# Patient Record
Sex: MALE | Race: OTHER | NOT HISPANIC OR LATINO
[De-identification: names, ages, dates, MRNs, and addresses within clinical notes are randomized per-mention and may not be internally consistent; named-entity substitution may affect disease eponyms.]

---

## 2020-10-01 PROBLEM — Z00.00 ENCOUNTER FOR PREVENTIVE HEALTH EXAMINATION: Status: ACTIVE | Noted: 2020-10-01

## 2020-10-09 ENCOUNTER — APPOINTMENT (OUTPATIENT)
Dept: UROLOGY | Facility: CLINIC | Age: 75
End: 2020-10-09
Payer: MEDICARE

## 2020-10-09 VITALS
TEMPERATURE: 97.7 F | BODY MASS INDEX: 27 KG/M2 | RESPIRATION RATE: 16 BRPM | DIASTOLIC BLOOD PRESSURE: 70 MMHG | WEIGHT: 172 LBS | SYSTOLIC BLOOD PRESSURE: 137 MMHG | HEART RATE: 55 BPM | HEIGHT: 67 IN

## 2020-10-09 DIAGNOSIS — N20.0 CALCULUS OF KIDNEY: ICD-10-CM

## 2020-10-09 DIAGNOSIS — R35.1 BENIGN PROSTATIC HYPERPLASIA WITH LOWER URINARY TRACT SYMPMS: ICD-10-CM

## 2020-10-09 DIAGNOSIS — Z86.39 PERSONAL HISTORY OF OTHER ENDOCRINE, NUTRITIONAL AND METABOLIC DISEASE: ICD-10-CM

## 2020-10-09 DIAGNOSIS — R97.20 ELEVATED PROSTATE, SPECIFIC ANTIGEN [PSA]: ICD-10-CM

## 2020-10-09 DIAGNOSIS — Z78.9 OTHER SPECIFIED HEALTH STATUS: ICD-10-CM

## 2020-10-09 DIAGNOSIS — Z80.1 FAMILY HISTORY OF MALIGNANT NEOPLASM OF TRACHEA, BRONCHUS AND LUNG: ICD-10-CM

## 2020-10-09 DIAGNOSIS — N40.1 BENIGN PROSTATIC HYPERPLASIA WITH LOWER URINARY TRACT SYMPMS: ICD-10-CM

## 2020-10-09 PROCEDURE — 99204 OFFICE O/P NEW MOD 45 MIN: CPT

## 2020-10-09 RX ORDER — TAMSULOSIN HYDROCHLORIDE 0.4 MG/1
0.4 CAPSULE ORAL
Refills: 0 | Status: ACTIVE | COMMUNITY

## 2020-10-09 RX ORDER — PRAVASTATIN SODIUM 80 MG/1
80 TABLET ORAL
Refills: 0 | Status: ACTIVE | COMMUNITY

## 2020-10-09 NOTE — PHYSICAL EXAM
[General Appearance - Well Developed] : well developed [General Appearance - Well Nourished] : well nourished [Normal Appearance] : normal appearance [Well Groomed] : well groomed [General Appearance - In No Acute Distress] : no acute distress [Edema] : no peripheral edema [Respiration, Rhythm And Depth] : normal respiratory rhythm and effort [Exaggerated Use Of Accessory Muscles For Inspiration] : no accessory muscle use [Abdomen Soft] : soft [Abdomen Tenderness] : non-tender [Costovertebral Angle Tenderness] : no ~M costovertebral angle tenderness [Urethral Meatus] : meatus normal [Penis Abnormality] : normal circumcised penis [Urinary Bladder Findings] : the bladder was normal on palpation [Scrotum] : the scrotum was normal [Testes Tenderness] : no tenderness of the testes [Testes Mass (___cm)] : there were no testicular masses [Prostate Tenderness] : the prostate was not tender [No Prostate Nodules] : no prostate nodules [Prostate Enlarged] : was enlarged [Normal Station and Gait] : the gait and station were normal for the patient's age [] : no rash [No Focal Deficits] : no focal deficits [Oriented To Time, Place, And Person] : oriented to person, place, and time [Affect] : the affect was normal [Mood] : the mood was normal [Not Anxious] : not anxious [Inguinal Lymph Nodes Enlarged Bilaterally] : inguinal

## 2020-10-09 NOTE — ASSESSMENT
[FreeTextEntry1] : He seems to be satisfied from urinary standpoint. Continue Flomax. He stopped Proscar since he did not notice a significant difference in urination. Residual urine volume is acceptable. Recommended returning to [facility] for renal sonogram in followup to kidney stone. PSA level was discussed in detail. His current level is same as several years ago when he was not on finasteride. Finasteride reduces PSA level by 50% or more. He could continue observation or consider MRI of the prostate. For now, he will proceed with watchful waiting and followup in 6-9 months. Risk of prostate cancer with any PSA level was discussed.\par \par Josue Mancera MD, FACS\par [facility]\par  of Urology\par \par [address], [address]\[place] [address]\par \par [address], [address]\[place] [address]\par \par Tel: [phone]\par Fax: [phone]

## 2020-10-09 NOTE — HISTORY OF PRESENT ILLNESS
[FreeTextEntry1] : He is a 75-year-old man who was seen today for initial visit for several urological issues. He has history of enlarged prostate, urinary symptoms, elevated PSA level and kidney stones. He is on Flomax now. Nocturia is one or 2 times. He used to be on Proscar which he stopped in early 2020. He has a sensation of incomplete bladder emptying. PSA level was 5.9 in August 2020, 1.68 in 2018 and 5.8 in 2014. He recalls having at least 3-4 prostate biopsies when he used to live in [place]. He was told they were negative. Also once he required shock wave lithotripsy and stent placement for kidney stone. He has not had a recent imaging of the kidney. Residual urine was 80 cc.

## 2020-10-09 NOTE — LETTER BODY
[Dear  ___] : Dear  [unfilled], [Consult Letter:] : I had the pleasure of evaluating your patient, [unfilled]. [Consult Closing:] : Thank you very much for allowing me to participate in the care of this patient.  If you have any questions, please do not hesitate to contact me. [FreeTextEntry1] : [facility]\par [address] \[place], [place], [address] \par [phone] \par

## 2020-10-09 NOTE — REVIEW OF SYSTEMS
[see HPI] : see HPI [Negative] : Heme/Lymph [History of kidney stones] : history of kidney stones [Wake up at night to urinate  How many times?  ___] : wakes up to urinate [unfilled] times during the night [Slow urine stream] : slow urine stream [Bladder fullness after urinating] : bladder fullness after urinating

## 2021-06-11 ENCOUNTER — APPOINTMENT (OUTPATIENT)
Dept: UROLOGY | Facility: CLINIC | Age: 76
End: 2021-06-11

## 2023-02-08 ENCOUNTER — OFFICE (OUTPATIENT)
Dept: URBAN - METROPOLITAN AREA CLINIC 1 | Facility: CLINIC | Age: 78
Setting detail: OPHTHALMOLOGY
End: 2023-02-08
Payer: MEDICARE

## 2023-02-08 DIAGNOSIS — H25.13: ICD-10-CM

## 2023-02-08 DIAGNOSIS — H01.001: ICD-10-CM

## 2023-02-08 DIAGNOSIS — H01.002: ICD-10-CM

## 2023-02-08 DIAGNOSIS — H01.004: ICD-10-CM

## 2023-02-08 PROBLEM — H01.005 BLEPHARITIS; RIGHT UPPER LID, RIGHT LOWER LID, LEFT UPPER LID, LEFT LOWER LID: Status: ACTIVE | Noted: 2023-02-08

## 2023-02-08 PROBLEM — H16.223 DRY EYE SYNDROME K SICCA; BOTH EYES: Status: ACTIVE | Noted: 2023-02-08

## 2023-02-08 PROCEDURE — 99204 OFFICE O/P NEW MOD 45 MIN: CPT | Performed by: OPHTHALMOLOGY

## 2023-02-08 ASSESSMENT — VISUAL ACUITY
OS_BCVA: 20/30
OD_BCVA: 20/30

## 2023-02-08 ASSESSMENT — REFRACTION_CURRENTRX
OD_OVR_VA: 20/
OD_SPHERE: -0.75
OD_ADD: +2.50
OD_VPRISM_DIRECTION: PROGS
OS_VPRISM_DIRECTION: PROGS
OS_CYLINDER: -1.00
OS_SPHERE: +0.25
OS_AXIS: 067
OS_ADD: +2.50
OS_OVR_VA: 20/

## 2023-02-08 ASSESSMENT — LID EXAM ASSESSMENTS
OS_BLEPHARITIS: LLL LUL
OD_BLEPHARITIS: RLL RUL

## 2023-02-08 ASSESSMENT — KERATOMETRY
OD_AXISANGLE_DEGREES: 038
OD_K1POWER_DIOPTERS: 41.75
OD_K2POWER_DIOPTERS: 42.00
OS_K2POWER_DIOPTERS: 42.00
OS_K1POWER_DIOPTERS: 42.00
OS_AXISANGLE_DEGREES: 090

## 2023-02-08 ASSESSMENT — SPHEQUIV_DERIVED
OS_SPHEQUIV: 0.375
OD_SPHEQUIV: -0.875
OD_SPHEQUIV: -0.875
OS_SPHEQUIV: 0.375

## 2023-02-08 ASSESSMENT — AXIALLENGTH_DERIVED
OD_AL: 24.5685
OS_AL: 24.0038
OD_AL: 24.5685
OS_AL: 24.0038

## 2023-02-08 ASSESSMENT — REFRACTION_MANIFEST
OD_CYLINDER: -1.25
OD_SPHERE: -0.25
OS_VA1: 20/25-2
OS_CYLINDER: -0.75
OS_SPHERE: +0.75
OD_VA1: 20/30-2
OD_AXIS: 115
OS_AXIS: 060

## 2023-02-08 ASSESSMENT — TONOMETRY
OD_IOP_MMHG: 12
OS_IOP_MMHG: 14

## 2023-02-08 ASSESSMENT — REFRACTION_AUTOREFRACTION
OS_SPHERE: +0.75
OS_AXIS: 058
OD_AXIS: 114
OD_CYLINDER: -1.25
OS_CYLINDER: -0.75
OD_SPHERE: -0.25

## 2023-02-08 ASSESSMENT — CONFRONTATIONAL VISUAL FIELD TEST (CVF)
OD_FINDINGS: FULL
OS_FINDINGS: FULL

## 2023-04-24 ENCOUNTER — OFFICE (OUTPATIENT)
Dept: URBAN - METROPOLITAN AREA CLINIC 1 | Facility: CLINIC | Age: 78
Setting detail: OPHTHALMOLOGY
End: 2023-04-24
Payer: MEDICARE

## 2023-04-24 DIAGNOSIS — H25.13: ICD-10-CM

## 2023-04-24 DIAGNOSIS — H25.12: ICD-10-CM

## 2023-04-24 PROCEDURE — 99213 OFFICE O/P EST LOW 20 MIN: CPT | Performed by: OPHTHALMOLOGY

## 2023-04-24 PROCEDURE — 92136 OPHTHALMIC BIOMETRY: CPT | Performed by: OPHTHALMOLOGY

## 2023-04-24 ASSESSMENT — SPHEQUIV_DERIVED
OS_SPHEQUIV: 0.375
OD_SPHEQUIV: -0.875
OS_SPHEQUIV: 0.375
OD_SPHEQUIV: -0.875

## 2023-04-24 ASSESSMENT — KERATOMETRY
OS_AXISANGLE_DEGREES: 090
OS_K1POWER_DIOPTERS: 42.25
OS_K2POWER_DIOPTERS: 42.25
OD_AXISANGLE_DEGREES: 037
OD_K1POWER_DIOPTERS: 41.75
OD_K2POWER_DIOPTERS: 42.00

## 2023-04-24 ASSESSMENT — REFRACTION_MANIFEST
OD_AXIS: 115
OS_AXIS: 060
OD_SPHERE: -0.25
OD_CYLINDER: -1.25
OS_SPHERE: +0.75
OS_VA1: 20/25-2
OS_CYLINDER: -0.75
OD_VA1: 20/30-2

## 2023-04-24 ASSESSMENT — REFRACTION_AUTOREFRACTION
OS_SPHERE: +0.75
OD_AXIS: 116
OD_CYLINDER: -1.25
OD_SPHERE: -0.25
OS_AXIS: 067
OS_CYLINDER: -0.75

## 2023-04-24 ASSESSMENT — AXIALLENGTH_DERIVED
OS_AL: 23.9091
OD_AL: 24.5685
OS_AL: 23.9091
OD_AL: 24.5685

## 2023-04-24 ASSESSMENT — LID EXAM ASSESSMENTS
OD_BLEPHARITIS: RLL RUL
OS_BLEPHARITIS: LLL LUL

## 2023-04-24 ASSESSMENT — REFRACTION_CURRENTRX
OS_OVR_VA: 20/
OD_ADD: +2.50
OS_VPRISM_DIRECTION: PROGS
OD_OVR_VA: 20/
OS_CYLINDER: -1.00
OS_AXIS: 067
OS_SPHERE: +0.25
OS_ADD: +2.50
OD_VPRISM_DIRECTION: PROGS
OD_SPHERE: -0.75

## 2023-04-24 ASSESSMENT — TONOMETRY
OS_IOP_MMHG: 15
OD_IOP_MMHG: 15

## 2023-04-24 ASSESSMENT — CONFRONTATIONAL VISUAL FIELD TEST (CVF)
OS_FINDINGS: FULL
OD_FINDINGS: FULL

## 2023-04-24 ASSESSMENT — VISUAL ACUITY
OD_BCVA: 20/25-
OS_BCVA: 20/30-

## 2023-05-04 ENCOUNTER — ASC (OUTPATIENT)
Dept: URBAN - METROPOLITAN AREA SURGERY 8 | Facility: SURGERY | Age: 78
Setting detail: OPHTHALMOLOGY
End: 2023-05-04
Payer: MEDICARE

## 2023-05-04 DIAGNOSIS — H52.212: ICD-10-CM

## 2023-05-04 DIAGNOSIS — H25.12: ICD-10-CM

## 2023-05-04 PROCEDURE — FEMTO FEMTOSECOND LASER: Performed by: OPHTHALMOLOGY

## 2023-05-04 PROCEDURE — 66984 XCAPSL CTRC RMVL W/O ECP: CPT | Performed by: OPHTHALMOLOGY

## 2023-05-05 ENCOUNTER — RX ONLY (RX ONLY)
Age: 78
End: 2023-05-05

## 2023-05-05 ENCOUNTER — OFFICE (OUTPATIENT)
Dept: URBAN - METROPOLITAN AREA CLINIC 1 | Facility: CLINIC | Age: 78
Setting detail: OPHTHALMOLOGY
End: 2023-05-05
Payer: MEDICARE

## 2023-05-05 DIAGNOSIS — Z96.1: ICD-10-CM

## 2023-05-05 PROCEDURE — 99024 POSTOP FOLLOW-UP VISIT: CPT

## 2023-05-05 ASSESSMENT — REFRACTION_CURRENTRX
OD_SPHERE: -0.75
OS_SPHERE: +0.25
OS_AXIS: 067
OD_OVR_VA: 20/
OD_VPRISM_DIRECTION: PROGS
OS_CYLINDER: -1.00
OS_ADD: +2.50
OS_VPRISM_DIRECTION: PROGS
OS_OVR_VA: 20/
OD_ADD: +2.50

## 2023-05-05 ASSESSMENT — KERATOMETRY
OS_AXISANGLE_DEGREES: 120
METHOD_AUTO_MANUAL: AUTO
OD_AXISANGLE_DEGREES: 065
OS_K2POWER_DIOPTERS: 42.00
OS_K1POWER_DIOPTERS: 41.75
OD_K2POWER_DIOPTERS: 42.00
OD_K1POWER_DIOPTERS: 41.75

## 2023-05-05 ASSESSMENT — REFRACTION_MANIFEST
OD_CYLINDER: -1.25
OS_AXIS: 060
OD_AXIS: 115
OS_VA1: 20/25-2
OD_VA1: 20/30-2
OS_CYLINDER: -0.75
OS_SPHERE: +0.75
OD_SPHERE: -0.25

## 2023-05-05 ASSESSMENT — VISUAL ACUITY
OS_BCVA: 20/30
OD_BCVA: 20/20

## 2023-05-05 ASSESSMENT — REFRACTION_AUTOREFRACTION
OS_SPHERE: +0.25
OS_CYLINDER: -0.50
OD_CYLINDER: -1.25
OD_SPHERE: -0.25
OD_AXIS: 115
OS_AXIS: 040

## 2023-05-05 ASSESSMENT — AXIALLENGTH_DERIVED
OS_AL: 24.0515
OD_AL: 24.5685
OS_AL: 24.2043
OD_AL: 24.5685

## 2023-05-05 ASSESSMENT — LID EXAM ASSESSMENTS
OS_BLEPHARITIS: LLL LUL
OD_BLEPHARITIS: RLL RUL

## 2023-05-05 ASSESSMENT — CONFRONTATIONAL VISUAL FIELD TEST (CVF)
OD_FINDINGS: FULL
OS_FINDINGS: FULL

## 2023-05-05 ASSESSMENT — CORNEAL EDEMA CLINICAL DESCRIPTION: OS_CORNEALEDEMA: T

## 2023-05-05 ASSESSMENT — TONOMETRY
OD_IOP_MMHG: 14
OS_IOP_MMHG: 15

## 2023-05-05 ASSESSMENT — SPHEQUIV_DERIVED
OD_SPHEQUIV: -0.875
OD_SPHEQUIV: -0.875
OS_SPHEQUIV: 0
OS_SPHEQUIV: 0.375

## 2023-05-10 ENCOUNTER — OFFICE (OUTPATIENT)
Dept: URBAN - METROPOLITAN AREA CLINIC 1 | Facility: CLINIC | Age: 78
Setting detail: OPHTHALMOLOGY
End: 2023-05-10
Payer: MEDICARE

## 2023-05-10 DIAGNOSIS — H25.11: ICD-10-CM

## 2023-05-10 PROCEDURE — 92136 OPHTHALMIC BIOMETRY: CPT | Performed by: OPHTHALMOLOGY

## 2023-05-10 ASSESSMENT — CONFRONTATIONAL VISUAL FIELD TEST (CVF)
OS_FINDINGS: FULL
OD_FINDINGS: FULL

## 2023-05-10 ASSESSMENT — LID EXAM ASSESSMENTS
OD_BLEPHARITIS: RLL RUL
OS_BLEPHARITIS: LLL LUL

## 2023-05-10 ASSESSMENT — TONOMETRY
OS_IOP_MMHG: 12
OD_IOP_MMHG: 17

## 2023-05-11 ENCOUNTER — ASC (OUTPATIENT)
Dept: URBAN - METROPOLITAN AREA SURGERY 8 | Facility: SURGERY | Age: 78
Setting detail: OPHTHALMOLOGY
End: 2023-05-11
Payer: MEDICARE

## 2023-05-11 DIAGNOSIS — H25.11: ICD-10-CM

## 2023-05-11 DIAGNOSIS — H52.211: ICD-10-CM

## 2023-05-11 PROCEDURE — FEMTO FEMTOSECOND LASER: Performed by: OPHTHALMOLOGY

## 2023-05-11 PROCEDURE — 66984 XCAPSL CTRC RMVL W/O ECP: CPT | Performed by: OPHTHALMOLOGY

## 2023-05-11 ASSESSMENT — REFRACTION_CURRENTRX
OD_ADD: +2.50
OD_OVR_VA: 20/
OS_CYLINDER: -1.00
OS_OVR_VA: 20/
OS_ADD: +2.50
OD_SPHERE: -0.75
OS_AXIS: 067
OS_VPRISM_DIRECTION: PROGS
OS_SPHERE: +0.25
OD_VPRISM_DIRECTION: PROGS

## 2023-05-11 ASSESSMENT — REFRACTION_AUTOREFRACTION
OS_AXIS: 50
OD_SPHERE: -0.50
OD_AXIS: 115
OS_CYLINDER: -0.50
OD_CYLINDER: -1.00
OS_SPHERE: +0.50

## 2023-05-11 ASSESSMENT — VISUAL ACUITY
OS_BCVA: 20/50-1
OD_BCVA: 20/20-1

## 2023-05-11 ASSESSMENT — AXIALLENGTH_DERIVED
OD_AL: 24.6214
OS_AL: 23.9564
OD_AL: 24.5685
OS_AL: 24.0067

## 2023-05-11 ASSESSMENT — KERATOMETRY
OD_AXISANGLE_DEGREES: 26
OS_K1POWER_DIOPTERS: 42.00
OS_K2POWER_DIOPTERS: 42.25
OS_AXISANGLE_DEGREES: 117
OD_K2POWER_DIOPTERS: 42.00
OD_K1POWER_DIOPTERS: 41.75
METHOD_AUTO_MANUAL: AUTO

## 2023-05-11 ASSESSMENT — SPHEQUIV_DERIVED
OS_SPHEQUIV: 0.25
OS_SPHEQUIV: 0.375
OD_SPHEQUIV: -0.875
OD_SPHEQUIV: -1

## 2023-05-11 ASSESSMENT — REFRACTION_MANIFEST
OD_AXIS: 115
OS_AXIS: 060
OS_CYLINDER: -0.75
OS_SPHERE: +0.75
OD_CYLINDER: -1.25
OD_VA1: 20/30-2
OD_SPHERE: -0.25
OS_VA1: 20/25-2

## 2023-05-12 ENCOUNTER — OFFICE (OUTPATIENT)
Dept: URBAN - METROPOLITAN AREA CLINIC 1 | Facility: CLINIC | Age: 78
Setting detail: OPHTHALMOLOGY
End: 2023-05-12
Payer: MEDICARE

## 2023-05-12 ENCOUNTER — RX ONLY (RX ONLY)
Age: 78
End: 2023-05-12

## 2023-05-12 DIAGNOSIS — Z96.1: ICD-10-CM

## 2023-05-12 PROCEDURE — 99024 POSTOP FOLLOW-UP VISIT: CPT

## 2023-05-12 ASSESSMENT — AXIALLENGTH_DERIVED
OD_AL: 24.2013
OD_AL: 24.6184
OS_AL: 23.9091
OS_AL: 24.0096

## 2023-05-12 ASSESSMENT — KERATOMETRY
OD_K1POWER_DIOPTERS: 41.75
OS_K2POWER_DIOPTERS: 42.50
OD_K2POWER_DIOPTERS: 41.75
OS_K1POWER_DIOPTERS: 42.00
OD_AXISANGLE_DEGREES: 90
METHOD_AUTO_MANUAL: AUTO
OS_AXISANGLE_DEGREES: 120

## 2023-05-12 ASSESSMENT — REFRACTION_AUTOREFRACTION
OD_AXIS: 98
OD_CYLINDER: -0.25
OS_SPHERE: +0.50
OS_AXIS: 53
OD_SPHERE: +0.25
OS_CYLINDER: -0.75

## 2023-05-12 ASSESSMENT — REFRACTION_MANIFEST
OD_AXIS: 115
OS_SPHERE: +0.75
OS_AXIS: 060
OD_VA1: 20/30-2
OD_CYLINDER: -1.25
OS_VA1: 20/25-2
OS_CYLINDER: -0.75
OD_SPHERE: -0.25

## 2023-05-12 ASSESSMENT — TONOMETRY
OS_IOP_MMHG: 12
OD_IOP_MMHG: 13

## 2023-05-12 ASSESSMENT — REFRACTION_CURRENTRX
OD_SPHERE: -0.75
OS_CYLINDER: -1.00
OS_AXIS: 067
OS_SPHERE: +0.25
OD_OVR_VA: 20/
OS_VPRISM_DIRECTION: PROGS
OS_OVR_VA: 20/
OD_VPRISM_DIRECTION: PROGS
OD_ADD: +2.50
OS_ADD: +2.50

## 2023-05-12 ASSESSMENT — CONFRONTATIONAL VISUAL FIELD TEST (CVF)
OD_FINDINGS: FULL
OS_FINDINGS: FULL

## 2023-05-12 ASSESSMENT — SPHEQUIV_DERIVED
OS_SPHEQUIV: 0.125
OD_SPHEQUIV: 0.125
OS_SPHEQUIV: 0.375
OD_SPHEQUIV: -0.875

## 2023-05-12 ASSESSMENT — VISUAL ACUITY
OD_BCVA: 20/20
OS_BCVA: 20/20

## 2023-05-12 ASSESSMENT — LID EXAM ASSESSMENTS
OD_BLEPHARITIS: RLL RUL
OS_BLEPHARITIS: LLL LUL

## 2023-05-23 ENCOUNTER — OFFICE (OUTPATIENT)
Dept: URBAN - METROPOLITAN AREA CLINIC 1 | Facility: CLINIC | Age: 78
Setting detail: OPHTHALMOLOGY
End: 2023-05-23
Payer: MEDICARE

## 2023-05-23 DIAGNOSIS — Z96.1: ICD-10-CM

## 2023-05-23 PROCEDURE — 99024 POSTOP FOLLOW-UP VISIT: CPT

## 2023-05-23 ASSESSMENT — REFRACTION_CURRENTRX
OS_OVR_VA: 20/
OS_SPHERE: +0.25
OS_ADD: +2.50
OS_AXIS: 067
OD_SPHERE: -0.75
OD_ADD: +2.50
OS_VPRISM_DIRECTION: PROGS
OS_CYLINDER: -1.00
OD_OVR_VA: 20/
OD_VPRISM_DIRECTION: PROGS

## 2023-05-23 ASSESSMENT — KERATOMETRY
OS_K2POWER_DIOPTERS: 42.00
OD_K1POWER_DIOPTERS: 41.75
OD_K2POWER_DIOPTERS: 41.75
METHOD_AUTO_MANUAL: AUTO
OS_AXISANGLE_DEGREES: 090
OD_AXISANGLE_DEGREES: 090
OS_K1POWER_DIOPTERS: 42.00

## 2023-05-23 ASSESSMENT — CONFRONTATIONAL VISUAL FIELD TEST (CVF)
OS_FINDINGS: FULL
OD_FINDINGS: FULL

## 2023-05-23 ASSESSMENT — REFRACTION_AUTOREFRACTION
OS_SPHERE: +0.50
OD_AXIS: 110
OD_CYLINDER: -0.50
OS_CYLINDER: -0.50
OS_AXIS: 060
OD_SPHERE: +0.25

## 2023-05-23 ASSESSMENT — REFRACTION_MANIFEST
OD_VA1: 20/30-2
OS_SPHERE: +0.75
OS_CYLINDER: -0.75
OD_AXIS: 115
OD_SPHERE: -0.25
OS_VA1: 20/25-2
OD_CYLINDER: -1.25
OS_AXIS: 060

## 2023-05-23 ASSESSMENT — AXIALLENGTH_DERIVED
OS_AL: 24.0038
OS_AL: 24.0544
OD_AL: 24.2527
OD_AL: 24.6184

## 2023-05-23 ASSESSMENT — VISUAL ACUITY
OD_BCVA: 20/20
OS_BCVA: 20/20-2

## 2023-05-23 ASSESSMENT — SPHEQUIV_DERIVED
OS_SPHEQUIV: 0.375
OS_SPHEQUIV: 0.25
OD_SPHEQUIV: 0
OD_SPHEQUIV: -0.875

## 2023-05-23 ASSESSMENT — LID EXAM ASSESSMENTS
OD_BLEPHARITIS: RLL RUL
OS_BLEPHARITIS: LLL LUL

## 2023-05-23 ASSESSMENT — TONOMETRY
OD_IOP_MMHG: 10
OS_IOP_MMHG: 11

## 2023-06-19 ENCOUNTER — OFFICE (OUTPATIENT)
Dept: URBAN - METROPOLITAN AREA CLINIC 1 | Facility: CLINIC | Age: 78
Setting detail: OPHTHALMOLOGY
End: 2023-06-19
Payer: MEDICARE

## 2023-06-19 ENCOUNTER — RX ONLY (RX ONLY)
Age: 78
End: 2023-06-19

## 2023-06-19 DIAGNOSIS — Z96.1: ICD-10-CM

## 2023-06-19 PROCEDURE — 99024 POSTOP FOLLOW-UP VISIT: CPT | Performed by: OPHTHALMOLOGY

## 2023-06-19 ASSESSMENT — SPHEQUIV_DERIVED
OD_SPHEQUIV: 0
OS_SPHEQUIV: 0
OD_SPHEQUIV: -0.875
OS_SPHEQUIV: 0.375

## 2023-06-19 ASSESSMENT — REFRACTION_AUTOREFRACTION
OS_CYLINDER: -0.50
OS_SPHERE: +0.25
OD_AXIS: 108
OS_AXIS: 061
OD_CYLINDER: -0.50
OD_SPHERE: +0.25

## 2023-06-19 ASSESSMENT — REFRACTION_CURRENTRX
OD_ADD: +2.50
OS_AXIS: 067
OD_OVR_VA: 20/
OS_VPRISM_DIRECTION: PROGS
OD_SPHERE: -0.75
OS_SPHERE: +0.25
OS_OVR_VA: 20/
OS_CYLINDER: -1.00
OD_VPRISM_DIRECTION: PROGS
OS_ADD: +2.50

## 2023-06-19 ASSESSMENT — KERATOMETRY
OD_AXISANGLE_DEGREES: 040
OS_K1POWER_DIOPTERS: 42.00
OS_AXISANGLE_DEGREES: 160
OD_K1POWER_DIOPTERS: 41.75
OS_K2POWER_DIOPTERS: 42.25
OD_K2POWER_DIOPTERS: 42.00
METHOD_AUTO_MANUAL: AUTO

## 2023-06-19 ASSESSMENT — VISUAL ACUITY
OD_BCVA: 20/20-1
OS_BCVA: 20/20-1

## 2023-06-19 ASSESSMENT — LID EXAM ASSESSMENTS
OD_BLEPHARITIS: RLL RUL
OS_BLEPHARITIS: LLL LUL

## 2023-06-19 ASSESSMENT — AXIALLENGTH_DERIVED
OD_AL: 24.5685
OD_AL: 24.2043
OS_AL: 24.108
OS_AL: 23.9564

## 2023-06-19 ASSESSMENT — CONFRONTATIONAL VISUAL FIELD TEST (CVF)
OS_FINDINGS: FULL
OD_FINDINGS: FULL

## 2023-06-19 ASSESSMENT — REFRACTION_MANIFEST
OD_VA1: 20/30-2
OD_SPHERE: PLANO
OS_SPHERE: +0.75
OD_VA1: 20/20
OD_SPHERE: -0.25
OS_SPHERE: PLANO
OU_VA: 20/20-1
OS_CYLINDER: -0.75
OS_VA1: 20/25-2
OS_VA1: 20/20-1
OS_ADD: +2.50
OD_ADD: +2.50
OD_CYLINDER: -1.25
OD_AXIS: 115
OS_AXIS: 060

## 2023-06-19 ASSESSMENT — TONOMETRY: OS_IOP_MMHG: 11

## 2023-09-13 ENCOUNTER — OFFICE (OUTPATIENT)
Dept: URBAN - METROPOLITAN AREA CLINIC 1 | Facility: CLINIC | Age: 78
Setting detail: OPHTHALMOLOGY
End: 2023-09-13
Payer: MEDICARE

## 2023-09-13 DIAGNOSIS — Z96.1: ICD-10-CM

## 2023-09-13 DIAGNOSIS — H01.001: ICD-10-CM

## 2023-09-13 DIAGNOSIS — H16.223: ICD-10-CM

## 2023-09-13 DIAGNOSIS — H01.005: ICD-10-CM

## 2023-09-13 DIAGNOSIS — H01.004: ICD-10-CM

## 2023-09-13 DIAGNOSIS — H01.002: ICD-10-CM

## 2023-09-13 DIAGNOSIS — H43.813: ICD-10-CM

## 2023-09-13 PROCEDURE — 92201 OPSCPY EXTND RTA DRAW UNI/BI: CPT | Performed by: OPHTHALMOLOGY

## 2023-09-13 PROCEDURE — 92014 COMPRE OPH EXAM EST PT 1/>: CPT | Performed by: OPHTHALMOLOGY

## 2023-09-13 ASSESSMENT — REFRACTION_CURRENTRX
OD_SPHERE: -0.75
OD_OVR_VA: 20/
OS_CYLINDER: -1.00
OS_SPHERE: +0.25
OS_VPRISM_DIRECTION: PROGS
OD_VPRISM_DIRECTION: PROGS
OS_ADD: +2.50
OS_AXIS: 067
OD_ADD: +2.50
OS_OVR_VA: 20/

## 2023-09-13 ASSESSMENT — REFRACTION_AUTOREFRACTION
OD_CYLINDER: -0.50
OS_CYLINDER: -0.50
OS_SPHERE: +0.25
OD_AXIS: 108
OS_AXIS: 061
OD_SPHERE: +0.25

## 2023-09-13 ASSESSMENT — KERATOMETRY
OD_K2POWER_DIOPTERS: 42.25
METHOD_AUTO_MANUAL: AUTO
OS_AXISANGLE_DEGREES: 90
OD_AXISANGLE_DEGREES: 39
OD_K1POWER_DIOPTERS: 42.00
OS_K2POWER_DIOPTERS: 42.25
OS_K1POWER_DIOPTERS: 42.25

## 2023-09-13 ASSESSMENT — TONOMETRY
OS_IOP_MMHG: 13
OD_IOP_MMHG: 11

## 2023-09-13 ASSESSMENT — SPHEQUIV_DERIVED
OS_SPHEQUIV: 0.375
OD_SPHEQUIV: -0.875
OS_SPHEQUIV: 0
OD_SPHEQUIV: 0

## 2023-09-13 ASSESSMENT — REFRACTION_MANIFEST
OS_ADD: +2.50
OS_SPHERE: PLANO
OD_AXIS: 115
OS_AXIS: 060
OU_VA: 20/20-1
OD_VA1: 20/30-2
OS_SPHERE: +0.75
OD_ADD: +2.50
OS_CYLINDER: -0.75
OS_VA1: 20/20-1
OD_VA1: 20/20
OS_VA1: 20/25-2
OD_SPHERE: -0.25
OD_CYLINDER: -1.25
OD_SPHERE: PLANO

## 2023-09-13 ASSESSMENT — AXIALLENGTH_DERIVED
OS_AL: 24.0602
OS_AL: 23.9091
OD_AL: 24.4693
OD_AL: 24.108

## 2023-09-13 ASSESSMENT — VISUAL ACUITY
OS_BCVA: 20/20
OD_BCVA: 20/20-1

## 2023-09-13 ASSESSMENT — LID EXAM ASSESSMENTS
OS_BLEPHARITIS: LLL LUL
OD_BLEPHARITIS: RLL RUL

## 2024-09-10 ENCOUNTER — OFFICE (OUTPATIENT)
Dept: URBAN - METROPOLITAN AREA CLINIC 1 | Facility: CLINIC | Age: 79
Setting detail: OPHTHALMOLOGY
End: 2024-09-10
Payer: MEDICARE

## 2024-09-10 DIAGNOSIS — H43.813: ICD-10-CM

## 2024-09-10 DIAGNOSIS — H01.005: ICD-10-CM

## 2024-09-10 DIAGNOSIS — H16.223: ICD-10-CM

## 2024-09-10 DIAGNOSIS — H01.004: ICD-10-CM

## 2024-09-10 DIAGNOSIS — H01.002: ICD-10-CM

## 2024-09-10 DIAGNOSIS — H01.001: ICD-10-CM

## 2024-09-10 DIAGNOSIS — H35.40: ICD-10-CM

## 2024-09-10 DIAGNOSIS — Z96.1: ICD-10-CM

## 2024-09-10 PROBLEM — H52.7 REFRACTIVE ERROR: Status: ACTIVE | Noted: 2024-09-10

## 2024-09-10 PROCEDURE — 92014 COMPRE OPH EXAM EST PT 1/>: CPT

## 2024-09-10 ASSESSMENT — LID EXAM ASSESSMENTS
OS_BLEPHARITIS: LLL LUL
OD_BLEPHARITIS: RLL RUL

## 2024-09-10 ASSESSMENT — CONFRONTATIONAL VISUAL FIELD TEST (CVF)
OD_FINDINGS: FULL
OS_FINDINGS: FULL